# Patient Record
Sex: FEMALE | Race: WHITE | NOT HISPANIC OR LATINO | URBAN - METROPOLITAN AREA
[De-identification: names, ages, dates, MRNs, and addresses within clinical notes are randomized per-mention and may not be internally consistent; named-entity substitution may affect disease eponyms.]

---

## 2017-05-20 ENCOUNTER — EMERGENCY (EMERGENCY)
Facility: HOSPITAL | Age: 52
LOS: 1 days | Discharge: HOME | End: 2017-05-20
Attending: EMERGENCY MEDICINE | Admitting: EMERGENCY MEDICINE
Payer: COMMERCIAL

## 2017-05-20 VITALS
HEART RATE: 57 BPM | TEMPERATURE: 98 F | SYSTOLIC BLOOD PRESSURE: 136 MMHG | DIASTOLIC BLOOD PRESSURE: 84 MMHG | OXYGEN SATURATION: 96 % | RESPIRATION RATE: 15 BRPM

## 2017-05-20 VITALS
HEART RATE: 62 BPM | OXYGEN SATURATION: 100 % | RESPIRATION RATE: 14 BRPM | SYSTOLIC BLOOD PRESSURE: 128 MMHG | HEIGHT: 65 IN | TEMPERATURE: 98 F | WEIGHT: 119.93 LBS | DIASTOLIC BLOOD PRESSURE: 86 MMHG

## 2017-05-20 DIAGNOSIS — Z98.891 HISTORY OF UTERINE SCAR FROM PREVIOUS SURGERY: Chronic | ICD-10-CM

## 2017-05-20 PROCEDURE — 70450 CT HEAD/BRAIN W/O DYE: CPT

## 2017-05-20 PROCEDURE — 99284 EMERGENCY DEPT VISIT MOD MDM: CPT | Mod: 25

## 2017-05-20 PROCEDURE — 70450 CT HEAD/BRAIN W/O DYE: CPT | Mod: 26

## 2017-05-20 PROCEDURE — 70486 CT MAXILLOFACIAL W/O DYE: CPT

## 2017-05-20 PROCEDURE — 99284 EMERGENCY DEPT VISIT MOD MDM: CPT

## 2017-05-20 PROCEDURE — 70486 CT MAXILLOFACIAL W/O DYE: CPT | Mod: 26

## 2017-05-20 RX ORDER — IBUPROFEN 200 MG
2 TABLET ORAL
Qty: 0 | Refills: 0 | COMMUNITY

## 2017-05-20 RX ORDER — TRAMADOL HYDROCHLORIDE 50 MG/1
1 TABLET ORAL
Qty: 20 | Refills: 0 | OUTPATIENT
Start: 2017-05-20 | End: 2017-05-25

## 2017-05-20 NOTE — ED PROVIDER NOTE - OBJECTIVE STATEMENT
Patient came in complaining of frontal headache and pain top of head state been going on the past 4 days, state seen PMD 4 days ago started on Amoxicillin for poss sinusitis state symptom has not improve antibiotic change to cephalosporin this morning headache became so bad call PMD was told to came in to the hospital to have CT scan done. State prior to arrival in ER she took NSAID which seem to help the pain upon arrival to er.

## 2017-05-20 NOTE — ED ADULT NURSE NOTE - OBJECTIVE STATEMENT
Headache persistent for 1 1/2 weeks frontal and top of head, initially had congestion as well. Took 3 days of Amoxicillin without improvement and was switched to Cefadroxil 4 days ago, congestion relieved, headache persists with some Nausea. Headache persistent for 1 1/2 weeks frontal and top of head, initially had congestion as well. Took 3 days of Amoxicillin without improvement and was switched to Cefadroxil 4 days ago, congestion mostly relieved, headache persists with some nausea.

## 2017-05-20 NOTE — ED PROVIDER NOTE - CARE PLAN
Principal Discharge DX:	Acute non-recurrent maxillary sinusitis  Instructions for follow-up, activity and diet:	continue antibiotic prescribe by PMD

## 2017-05-20 NOTE — ED ADULT NURSE NOTE - CHPI ED SYMPTOMS NEG
no decreased eating/drinking/no chills/no vomiting/no numbness/no weakness/no fever/no dizziness/no tingling

## 2017-05-24 DIAGNOSIS — J01.00 ACUTE MAXILLARY SINUSITIS, UNSPECIFIED: ICD-10-CM

## 2017-05-24 DIAGNOSIS — R51 HEADACHE: ICD-10-CM

## 2017-05-29 ENCOUNTER — EMERGENCY (EMERGENCY)
Facility: HOSPITAL | Age: 52
LOS: 0 days | Discharge: HOME | End: 2017-05-29
Attending: EMERGENCY MEDICINE | Admitting: EMERGENCY MEDICINE
Payer: COMMERCIAL

## 2017-05-29 VITALS — HEIGHT: 64 IN | WEIGHT: 115.08 LBS

## 2017-05-29 VITALS
DIASTOLIC BLOOD PRESSURE: 82 MMHG | TEMPERATURE: 98 F | OXYGEN SATURATION: 100 % | SYSTOLIC BLOOD PRESSURE: 132 MMHG | HEART RATE: 60 BPM | RESPIRATION RATE: 19 BRPM

## 2017-05-29 DIAGNOSIS — Z98.891 HISTORY OF UTERINE SCAR FROM PREVIOUS SURGERY: Chronic | ICD-10-CM

## 2017-05-29 DIAGNOSIS — R51 HEADACHE: ICD-10-CM

## 2017-05-29 LAB
ANION GAP SERPL CALC-SCNC: 6 MMOL/L — SIGNIFICANT CHANGE UP (ref 5–17)
APTT BLD: 26.2 SEC — LOW (ref 27.5–37.4)
BASOPHILS # BLD AUTO: 0.1 K/UL — SIGNIFICANT CHANGE UP (ref 0–0.2)
BASOPHILS NFR BLD AUTO: 1.3 % — SIGNIFICANT CHANGE UP (ref 0–2)
BUN SERPL-MCNC: 24 MG/DL — HIGH (ref 7–23)
CALCIUM SERPL-MCNC: 8.9 MG/DL — SIGNIFICANT CHANGE UP (ref 8.5–10.1)
CHLORIDE SERPL-SCNC: 105 MMOL/L — SIGNIFICANT CHANGE UP (ref 96–108)
CO2 SERPL-SCNC: 31 MMOL/L — SIGNIFICANT CHANGE UP (ref 22–31)
CREAT SERPL-MCNC: 0.77 MG/DL — SIGNIFICANT CHANGE UP (ref 0.5–1.3)
EOSINOPHIL # BLD AUTO: 0.1 K/UL — SIGNIFICANT CHANGE UP (ref 0–0.5)
EOSINOPHIL NFR BLD AUTO: 1.6 % — SIGNIFICANT CHANGE UP (ref 0–6)
GLUCOSE SERPL-MCNC: 104 MG/DL — HIGH (ref 70–99)
HCT VFR BLD CALC: 42.3 % — SIGNIFICANT CHANGE UP (ref 34.5–45)
HGB BLD-MCNC: 14.6 G/DL — SIGNIFICANT CHANGE UP (ref 11.5–15.5)
INR BLD: 1.06 RATIO — SIGNIFICANT CHANGE UP (ref 0.88–1.16)
LYMPHOCYTES # BLD AUTO: 1 K/UL — SIGNIFICANT CHANGE UP (ref 1–3.3)
LYMPHOCYTES # BLD AUTO: 20.4 % — SIGNIFICANT CHANGE UP (ref 13–44)
MCHC RBC-ENTMCNC: 31.8 PG — SIGNIFICANT CHANGE UP (ref 27–34)
MCHC RBC-ENTMCNC: 34.4 GM/DL — SIGNIFICANT CHANGE UP (ref 32–36)
MCV RBC AUTO: 92.5 FL — SIGNIFICANT CHANGE UP (ref 80–100)
MONOCYTES # BLD AUTO: 0.4 K/UL — SIGNIFICANT CHANGE UP (ref 0–0.9)
MONOCYTES NFR BLD AUTO: 8.1 % — SIGNIFICANT CHANGE UP (ref 2–14)
NEUTROPHILS # BLD AUTO: 3.4 K/UL — SIGNIFICANT CHANGE UP (ref 1.8–7.4)
NEUTROPHILS NFR BLD AUTO: 68.6 % — SIGNIFICANT CHANGE UP (ref 43–77)
PLATELET # BLD AUTO: 154 K/UL — SIGNIFICANT CHANGE UP (ref 150–400)
POTASSIUM SERPL-MCNC: 4.3 MMOL/L — SIGNIFICANT CHANGE UP (ref 3.5–5.3)
POTASSIUM SERPL-SCNC: 4.3 MMOL/L — SIGNIFICANT CHANGE UP (ref 3.5–5.3)
PROTHROM AB SERPL-ACNC: 11.5 SEC — SIGNIFICANT CHANGE UP (ref 9.8–12.7)
RBC # BLD: 4.57 M/UL — SIGNIFICANT CHANGE UP (ref 3.8–5.2)
RBC # FLD: 11 % — SIGNIFICANT CHANGE UP (ref 10.3–14.5)
SODIUM SERPL-SCNC: 142 MMOL/L — SIGNIFICANT CHANGE UP (ref 135–145)
WBC # BLD: 4.9 K/UL — SIGNIFICANT CHANGE UP (ref 3.8–10.5)
WBC # FLD AUTO: 4.9 K/UL — SIGNIFICANT CHANGE UP (ref 3.8–10.5)

## 2017-05-29 PROCEDURE — 70450 CT HEAD/BRAIN W/O DYE: CPT | Mod: 26

## 2017-05-29 PROCEDURE — 99285 EMERGENCY DEPT VISIT HI MDM: CPT

## 2017-05-29 RX ORDER — MORPHINE SULFATE 50 MG/1
4 CAPSULE, EXTENDED RELEASE ORAL ONCE
Qty: 0 | Refills: 0 | Status: DISCONTINUED | OUTPATIENT
Start: 2017-05-29 | End: 2017-05-29

## 2017-05-29 RX ORDER — PROCHLORPERAZINE MALEATE 5 MG
10 TABLET ORAL ONCE
Qty: 0 | Refills: 0 | Status: DISCONTINUED | OUTPATIENT
Start: 2017-05-29 | End: 2017-05-29

## 2017-05-29 RX ORDER — DIPHENHYDRAMINE HCL 50 MG
25 CAPSULE ORAL ONCE
Qty: 0 | Refills: 0 | Status: COMPLETED | OUTPATIENT
Start: 2017-05-29 | End: 2017-05-29

## 2017-05-29 RX ORDER — PROCHLORPERAZINE MALEATE 5 MG
10 TABLET ORAL ONCE
Qty: 0 | Refills: 0 | Status: COMPLETED | OUTPATIENT
Start: 2017-05-29 | End: 2017-05-29

## 2017-05-29 RX ORDER — SODIUM CHLORIDE 9 MG/ML
1000 INJECTION INTRAMUSCULAR; INTRAVENOUS; SUBCUTANEOUS ONCE
Qty: 0 | Refills: 0 | Status: COMPLETED | OUTPATIENT
Start: 2017-05-29 | End: 2017-05-29

## 2017-05-29 RX ORDER — ONDANSETRON 8 MG/1
4 TABLET, FILM COATED ORAL ONCE
Qty: 0 | Refills: 0 | Status: DISCONTINUED | OUTPATIENT
Start: 2017-05-29 | End: 2017-05-29

## 2017-05-29 RX ADMIN — SODIUM CHLORIDE 1000 MILLILITER(S): 9 INJECTION INTRAMUSCULAR; INTRAVENOUS; SUBCUTANEOUS at 11:55

## 2017-05-29 RX ADMIN — Medication 25 MILLIGRAM(S): at 11:55

## 2017-05-29 RX ADMIN — Medication 10 MILLIGRAM(S): at 11:55

## 2017-05-29 RX ADMIN — MORPHINE SULFATE 4 MILLIGRAM(S): 50 CAPSULE, EXTENDED RELEASE ORAL at 13:55

## 2017-05-29 NOTE — ED STATDOCS - PROGRESS NOTE DETAILS
KODI Lockwood Gadit:  Pt p/w worsening HA x 1 wk. Denies any fever, nefck stiffness, N/V. Pt was tx for sinusitis. PE: NAD, S1, S2, CTA bilat. Abd is soft, NT/ND, +BS. No focal deficit. CN grossly intact. Plan: check CT head, labs, meds, IVF and reasses. KODI Mohr:  s/w radiologist regarding the CT scan. states the old hemorrhage is months maybe more than a year old. recommended outpt MRI.  Called neurologist on call. KODI Mohr:  s/w radiologist regarding the CT scan. states the old hemorrhage is months maybe more than a year old. recommended outpt MRI.  Called neurologist on call Dr. Pierre, pt needs ot have outpt w/u for hygroma. KODI Jesusit:  Pt feeling better, copy of CT scan report and CD given. Has an appointment tomorrow with neurologist tomorrow. advised to f/u with them.

## 2017-05-29 NOTE — ED STATDOCS - NS ED MD SCRIBE ATTENDING SCRIBE SECTIONS
PAST MEDICAL/SURGICAL/SOCIAL HISTORY/RESULTS/REVIEW OF SYSTEMS/PROGRESS NOTE/HISTORY OF PRESENT ILLNESS/PHYSICAL EXAM/DISPOSITION

## 2017-05-29 NOTE — ED STATDOCS - OBJECTIVE STATEMENT
50 y/o female pt presents to ED complaining of worsening sinusitis and migraines for a week. Pt takes Amitrax for pain but states that it does nothing for relief. Pt was at [facility] last week where she underwent a CT scan. Pt has no changes to vision. ENT Dr. Caleb Valentino told pt to come to [facility]. PT has no other complaints and denies SOB, CP and fever/chills. 50 y/o female pt presents to ED complaining of worsening sinusitis and migraines for a week. Pt takes Amitrax for pain but states that it does nothing for relief. Pt was at [facility] last week where she underwent a CT scan. Pt has no changes to vision. ENT Dr. Caleb Valentino told pt to come to [facility]. PT has no other complaints and denies SOB, CP and fever/chills.  No neck pain/stiffness.

## 2017-05-29 NOTE — ED ADULT NURSE NOTE - CHPI ED SYMPTOMS NEG
no loss of consciousness/no fever/no syncope/no nausea/no change in level of consciousness/no chills/no numbness/no vomiting/no blurred vision

## 2017-05-29 NOTE — ED STATDOCS - MEDICAL DECISION MAKING DETAILS
52 y/o female pt presents to ED complaining of sinus and migraines. Plan CT scan and MRI. Will re-assess.

## 2017-05-29 NOTE — ED STATDOCS - CONSTITUTIONAL, MLM
normal... well appearing, well nourished, and in no apparent distress. well appearing, well nourished, and in no apparent distress. She is not toxic looking.

## 2017-05-29 NOTE — ED STATDOCS - DETAILS:
I, Katherine Gonzalez, performed the initial face to face bedside interview with this patient regarding history of present illness, review of symptoms and relevant past medical, social and family history.  I completed an independent physical examination.  I was the initial provider who evaluated this patient. I have signed out the follow up of any pending tests (i.e. labs, radiological studies) to the ACP.  I have communicated the patient’s plan of care and disposition with the ACP.  The history, relevant review of systems, past medical and surgical history, medical decision making, and physical examination was documented by the scribe in my presence and I attest to the accuracy of the documentation.  I personally saw the patient with the PA, and completed the key components of the history and physical exam. I then discussed the management plan with the PA.

## 2017-11-09 NOTE — ED ADULT NURSE NOTE - CAS EDN DISCHARGE INTERVENTIONS
PHYSICAL THERAPY - DAILY TREATMENT NOTE    Patient Name: Pérez Bansal        Date: 2017  : 1943   YES Patient  Verified  Visit #:   3   of   8  Insurance: Payor: [place] [employer] / Plan: [place] MEDICARE PART A & B / Product Type: Medicare /      In time: 3:10 Out time: 3:37   Total Treatment Time: 27     Medicare Time Tracking (below)   Total Timed Codes (min):  27 1:1 Treatment Time:  27     TREATMENT AREA =  Dizziness [R42]    SUBJECTIVE  Pain Level (on 0 to 10 scale):  4  / 10   Medication Changes/New allergies or changes in medical history, any new surgeries or procedures? NO    If yes, update Summary List   Subjective Functional Status/Changes:  []  No changes reported     Patient reports that she has increased L hip pain after doing strengthening exercises. Feels it is the one where she straightens her legs and  The one where she lifts her thighs up. She has penny consistent with HEP daily. OBJECTIVE    27 min Therapeutic Exercise:  [x]  See flow sheet   Rationale:      increase ROM, improve coordination, improve balance and increase proprioception to improve the patients ability to decrease fall risk        min Patient Education:  YES  Reviewed HEP   [x]  Progressed/Changed HEP based on: Add looking side to side and up and down slowly. Other Objective/Functional Measures:    Decreased hip flex, LAQs to 5 reps. Added stand against wall. Post Treatment Pain Level (on 0 to 10) scale:   4  / 10     ASSESSMENT  Assessment/Changes in Function:     Mild nausea with 3 reps of head movements. Patient able to stand unsupported 30 seconds while preparing to leave clinic. Good compliance with strengthening exercises. []  See Progress Note/Recertification   Patient will continue to benefit from skilled PT services to modify and progress therapeutic interventions, address imbalance/dizziness and instruct in home and community integration to attain remaining goals.    Progress toward goals / Updated goals:  1) Patient performing daily HEP and educated in fall prevention techniques. progressing  2) Patient will be able to maintain stand unsupported for 30 seconds eyes open to increase safety with ADLs. 3) Patient able to perform 5 repititions of cervical rotation, flexion and extension with no > mild symptoms. progressing  4) Patient to score 45 on DHI indicating improved function.   5) Increase bilateral hip flexion/abduction to 3+/5 to facilitate standing       PLAN  [x]  Upgrade activities as tolerated YES Continue plan of care   []  Discharge due to :    []  Other:      Therapist: Edie Morris, PT    Date: 11/9/2017 Time: 3:11 PM     Future Appointments  Date Time Provider Arielle Mcintoshi   11/13/2017 9:30 AM Omar Mccormick MD [id number] [place] [address]   11/16/2017 3:00 PM Noman Cowart PTA [facility]   11/21/2017 3:00 PM Edie Morris, PT [facility]   11/28/2017 3:00 PM Edie Morris, PT [facility]   12/5/2017 3:00 PM Noman Cowart PTA [facility]   12/12/2017 3:00 PM Edie Morris, PT [facility] IV discontinued, cath removed intact

## 2022-11-07 ENCOUNTER — LABORATORY RESULT (OUTPATIENT)
Age: 57
End: 2022-11-07

## 2022-11-07 ENCOUNTER — APPOINTMENT (OUTPATIENT)
Dept: PULMONOLOGY | Facility: CLINIC | Age: 57
End: 2022-11-07

## 2022-11-07 VITALS
WEIGHT: 108 LBS | TEMPERATURE: 97.7 F | OXYGEN SATURATION: 93 % | DIASTOLIC BLOOD PRESSURE: 62 MMHG | BODY MASS INDEX: 18.44 KG/M2 | HEIGHT: 64 IN | RESPIRATION RATE: 16 BRPM | SYSTOLIC BLOOD PRESSURE: 104 MMHG | HEART RATE: 54 BPM

## 2022-11-07 DIAGNOSIS — Z81.8 FAMILY HISTORY OF OTHER MENTAL AND BEHAVIORAL DISORDERS: ICD-10-CM

## 2022-11-07 DIAGNOSIS — Z86.19 PERSONAL HISTORY OF OTHER INFECTIOUS AND PARASITIC DISEASES: ICD-10-CM

## 2022-11-07 DIAGNOSIS — Z86.2 PERSONAL HISTORY OF DISEASES OF THE BLOOD AND BLOOD-FORMING ORGANS AND CERTAIN DISORDERS INVOLVING THE IMMUNE MECHANISM: ICD-10-CM

## 2022-11-07 DIAGNOSIS — Z82.49 FAMILY HISTORY OF ISCHEMIC HEART DISEASE AND OTHER DISEASES OF THE CIRCULATORY SYSTEM: ICD-10-CM

## 2022-11-07 DIAGNOSIS — Z78.9 OTHER SPECIFIED HEALTH STATUS: ICD-10-CM

## 2022-11-07 DIAGNOSIS — Z87.891 PERSONAL HISTORY OF NICOTINE DEPENDENCE: ICD-10-CM

## 2022-11-07 PROCEDURE — 94729 DIFFUSING CAPACITY: CPT

## 2022-11-07 PROCEDURE — 95012 NITRIC OXIDE EXP GAS DETER: CPT

## 2022-11-07 PROCEDURE — 71046 X-RAY EXAM CHEST 2 VIEWS: CPT

## 2022-11-07 PROCEDURE — 99204 OFFICE O/P NEW MOD 45 MIN: CPT | Mod: 25

## 2022-11-07 PROCEDURE — 94010 BREATHING CAPACITY TEST: CPT

## 2022-11-07 PROCEDURE — 94727 GAS DIL/WSHOT DETER LNG VOL: CPT

## 2022-11-07 PROCEDURE — 94618 PULMONARY STRESS TESTING: CPT

## 2022-11-07 RX ORDER — ACETAMINOPHEN EXTRA STRENGTH 500 MG/1
500 TABLET ORAL
Refills: 0 | Status: ACTIVE | COMMUNITY

## 2022-11-07 RX ORDER — ALPRAZOLAM 0.25 MG/1
0.25 TABLET ORAL
Qty: 5 | Refills: 0 | Status: ACTIVE | COMMUNITY
Start: 2022-08-31

## 2022-11-07 NOTE — PHYSICAL EXAM
[No Acute Distress] : no acute distress [Normal Oropharynx] : normal oropharynx [Normal Appearance] : normal appearance [No Neck Mass] : no neck mass [Normal Rate/Rhythm] : normal rate/rhythm [Normal S1, S2] : normal s1, s2 [No Murmurs] : no murmurs [No Resp Distress] : no resp distress [Clear to Auscultation Bilaterally] : clear to auscultation bilaterally [No Abnormalities] : no abnormalities [Benign] : benign [Normal Gait] : normal gait [No Clubbing] : no clubbing [No Cyanosis] : no cyanosis [No Edema] : no edema [FROM] : FROM [Normal Color/ Pigmentation] : normal color/ pigmentation [Oriented x3] : oriented x3 [No Focal Deficits] : no focal deficits [Normal Affect] : normal affect [III] : Mallampati Class: III [TextBox_2] : thin [TextBox_68] : I:E 1:3, clear

## 2022-11-07 NOTE — ASSESSMENT
[FreeTextEntry1] : Ms. MILLER is a 57 year year old female coming into the office today for an initial evaluation with a prior history of spinal leak s/p blood patch 2018, remote chickenpox, thrombocytopenia (allergic age 42 midst of pregnancy, s/p COVID-19 x2 1/2021 and summer 2022, carries the diagnosis of mild persistent asthma, chronic cough, s/p recent PNA Aug 2022 who now comes in for a pulmonary evaluation for chronic cough, persistent hemoptysis\par \par The patient's Chronic Cough is felt to be multifactorial:\par - Asthma\par - Allergy and Sinus\par - ?Reflux (less likely)\par - Bronchiectasis\par \par Problem 1: Chronic cough \par - Any cough greater than three weeks duration-differential diagnosis includes-asthma, upper airway cough syndrome, post nasal drip syndrome, gastroesophageal reflux, laryngopharyngeal reflux, cardiac disease (congestive heart failure, medicines, effects, etc), medication effects (b-blockers, ace inhibitors, ARBs, glaucoma meds, etc.), smoking, infectious, multifactorial, etc. \par \par Problem 2: Asthma \par - add Trelegy 200 1 inhalation QD \par - Inhaler technique reviewed as well as oral hygiene techniques reviewed with patient. Avoidance of cold air, extremes of temperature, rescue inhaler should be used before exercise. Order of medication reviewed with patient. Recommended use of a cool mist humidifier in the bedroom. \par - Asthma is believed to be caused by inherited (genetic) and environmental factor, but its exact cause is unknown. Asthma may be triggered by allergens, lung infections, or irritants in the air. Asthma triggers are different for each person \par \par Problem 3: Allergy and PND\par - Add Olopatadine 0.6% 1 sniff each nostril twice a day \par - Script given for blood work: asthma profile, food IgE panel, eosinophil level, IgE level, Vitamin D level \par - Environmental measures for allergies were encouraged including mattress and pillow cover, air purifier, and environmental controls. \par \par Problem 4: ?GERD\par -Rule of 2s: avoid eating too much, eating too late, eating too spicy, eating two hours before bed.\par - Things to avoid including overeating, spicy foods, tight clothing, eating within two hours of bed, this list is not all inclusive.\par - For treatments of reflux, possible options discussed including diet control, H2 blockers, PPIs, as well as coating motility agents discussed as treatment options. Timing of meals and proximity of last meal to sleep were discussed. If symptoms persist, a formal gastrointestinal evaluation is needed. \par \par Problem 5: Bronchiectasis\par - Complete strep pneumonia titers, quantitative immunoglobulins, IgG subclass. \par - Get CF Panel \par - Aerobika Device \par - Send Sputum for Afbx3 and C&S \par - Seen on the CT of the chest or chest x-ray signifies damaged bronchial tubes focal or diffuse which can be sites of recurrent infections. These areas can be colonized by various organisms including bacteria (hemophilous influenzae/Pseudomonas species etc.) as well as acid fast bacilli (mycobacterial disease- inclusive of TB/TAJ etc.). Sputum either for bacteria culture/sensitivity or AFB culture and sensitivity will need to be sent if the patient has sputum- 3 specimens on consecutive days will need to be dropped at the laboratory- if the patient can produce sputum.\par \par Problem 6: Hemoptysis (likely related to Bronchiectasis, ?TAJ/JU)\par - Get HRCT \par - Get Quantiferon gold test \par - Hydration \par - Quantification of the blood and if it persist then consider bronchoscopy depending on amount of blood \par - The patient has coughed up blood-possibilities include acute/bronchitis, bronchiectasis, endobronchial tumor, epistaxis or other hand and neck source or gastrointestinal site. \par \par Problem 7: COVID-19 Education \par - s/p COVID-19 x2 1/2021 and summer 2022\par \par Problem 8: Cardiac\par -Recommended cardiac follow up evaluation with cardiologist if needed \par \par Problem 9: Health maintenance\par -s/p flu shot\par -recommended strep pneumonia vaccines: Prevnar-20 vaccine, followed by Pneumo vaccine 23 one year following\par -recommended early intervention for URIs\par -recommended regular osteoporosis evaluations-recommended early dermatological evaluations\par -recommended after the age of 50 to the age of 70, colonoscopy every 5 years\par \par \par Follow up in 3-4 months\par pt is encouraged to call or fax the office with any questions or concerns.\par -Education provided to the PT regarding their visit and conditions.

## 2022-11-07 NOTE — PROCEDURE
[FreeTextEntry1] : FENO was 18; a normal value being less than 25\par Fractional exhaled nitric oxide (FENO) is regarded as a simple, noninvasive method for assessing eosinophilic airway inflammation. Produced by a variety of cells within the lung, nitric oxide (NO) concentrations are generally low in healthy individuals. However, high concentrations of NO appear to be involved in nonspecific host defense mechanisms and chronic inflammatory diseases such as asthma. The American Thoracic Society (ATS) therefore has recommended using FENO to aid in the diagnosis and monitoring of eosinophilic airway inflammation and asthma, and for identifying steroid responsive individuals whose chronic respiratory symptoms may be caused by airway inflammation. \par \par CT (8/15/2022) revealed patchy consolidation in the middle lobe with impacted and dilated bronchi. Infiltrates in the right lower lobe. Lingular peribronchial thickening and bronchiectasis. \par \par Full PFT- spi reveals normal flows; FEV1 was 2.75L which is 109% of predicted; normal lung volumes; normal diffusion at 19.3, which is 111% of predicted; normal flow volume loop \par \par CXR revealed a normal sized heart; RML bronchiectasis. No other infiltrate\par \par 6 minute walk test reveals a low saturation of 92% with no evidence of dyspnea or fatigue; walked 440 meters

## 2022-11-07 NOTE — HISTORY OF PRESENT ILLNESS
[TextBox_4] : Ms. MILLER is a 57 year old female presenting to the office today for initial pulmonary evaluation. Her chief complaint is-\par - she notes when she was pregnant she had low platelet count \par - she notes this past summer she started coughing \par - she notes then she coughed up about tablespoon of blood \par - she notes getting X-ray and CAT scan done \par - she notes it revealed PNA and Bronchiectasis and was given antibiotics\par - she notes she started coughing again last month and then again bring up little blood \par - she notes getting another X-Ray and was told there is no more PNA \par - she notes wet cough\par - she denies any visual issues \par - she notes feeling of lump in throat to clear \par - she notes sleeping well, about 6.5-7 hours \par - she denies snoring  \par - she notes nasal drip sometimes\par - she notes doing cardio, weights \par - she notes memory and concentration is good\par - she notes weight is stable \par \par she denies any headaches, nausea, vomiting, fever, chills, sweats, chest pain, chest pressure, diarrhea, constipation, dysphagia, dizziness, leg swelling, leg pain, itchy eyes, itchy ears, heartburn, reflux, sour taste in the mouth, myalgias or arthralgias.

## 2022-11-07 NOTE — ADDENDUM
[FreeTextEntry1] : Documented by Avi Quiles acting as a scribe for Dr. Chino Brandon on (11/07/2022).\par \par All medical record entries made by the Scribe were at my, Dr. Chino Brandon's, direction and personally dictated by me on (11/07/2022). I have reviewed the chart and agree that the record accurately reflects my personal performance of the history, physical exam, assessment and plan. I have personally directed, reviewed, and agree with the discharge instructions.

## 2022-11-08 LAB
24R-OH-CALCIDIOL SERPL-MCNC: 53.5 PG/ML
BASOPHILS # BLD AUTO: 0.02 K/UL
BASOPHILS NFR BLD AUTO: 0.3 %
DEPRECATED KAPPA LC FREE/LAMBDA SER: 1.11 RATIO
EOSINOPHIL # BLD AUTO: 0.05 K/UL
EOSINOPHIL NFR BLD AUTO: 0.8 %
HCT VFR BLD CALC: 40.7 %
HGB BLD-MCNC: 13.2 G/DL
IGA SER QL IEP: 143 MG/DL
IGG SER QL IEP: 828 MG/DL
IGM SER QL IEP: 64 MG/DL
IMM GRANULOCYTES NFR BLD AUTO: 0.2 %
KAPPA LC CSF-MCNC: 0.97 MG/DL
KAPPA LC SERPL-MCNC: 1.08 MG/DL
LYMPHOCYTES # BLD AUTO: 1.5 K/UL
LYMPHOCYTES NFR BLD AUTO: 24 %
MAN DIFF?: NORMAL
MCHC RBC-ENTMCNC: 31.1 PG
MCHC RBC-ENTMCNC: 32.4 GM/DL
MCV RBC AUTO: 96 FL
MONOCYTES # BLD AUTO: 0.47 K/UL
MONOCYTES NFR BLD AUTO: 7.5 %
NEUTROPHILS # BLD AUTO: 4.19 K/UL
NEUTROPHILS NFR BLD AUTO: 67.2 %
PLATELET # BLD AUTO: 188 K/UL
RBC # BLD: 4.24 M/UL
RBC # FLD: 13.2 %
WBC # FLD AUTO: 6.24 K/UL

## 2022-11-09 LAB
A ALTERNATA IGE QN: <0.1 KUA/L
A ALTERNATA IGE QN: <0.1 KUA/L
A FUMIGATUS IGE QN: <0.1 KUA/L
A FUMIGATUS IGE QN: <0.1 KUA/L
BERMUDA GRASS IGE QN: <0.1 KUA/L
BOXELDER IGE QN: <0.1 KUA/L
C ALBICANS IGE QN: <0.1 KUA/L
C HERBARUM IGE QN: <0.1 KUA/L
C HERBARUM IGE QN: <0.1 KUA/L
CALIF WALNUT IGE QN: <0.1 KUA/L
CAT DANDER IGE QN: <0.1 KUA/L
CAT DANDER IGE QN: <0.1 KUA/L
CLAM IGE QN: <0.1 KUA/L
CMN PIGWEED IGE QN: <0.1 KUA/L
CODFISH IGE QN: <0.1 KUA/L
COMMON RAGWEED IGE QN: <0.1 KUA/L
COMMON RAGWEED IGE QN: <0.1 KUA/L
CORN IGE QN: <0.1 KUA/L
COTTONWOOD IGE QN: <0.1 KUA/L
COW MILK IGE QN: 0.16 KUA/L
D FARINAE IGE QN: <0.1 KUA/L
D FARINAE IGE QN: <0.1 KUA/L
D PTERONYSS IGE QN: <0.1 KUA/L
D PTERONYSS IGE QN: <0.1 KUA/L
DEPRECATED A ALTERNATA IGE RAST QL: 0
DEPRECATED A ALTERNATA IGE RAST QL: 0
DEPRECATED A FUMIGATUS IGE RAST QL: 0
DEPRECATED A FUMIGATUS IGE RAST QL: 0
DEPRECATED BERMUDA GRASS IGE RAST QL: 0
DEPRECATED BOXELDER IGE RAST QL: 0
DEPRECATED C ALBICANS IGE RAST QL: 0
DEPRECATED C HERBARUM IGE RAST QL: 0
DEPRECATED C HERBARUM IGE RAST QL: 0
DEPRECATED CAT DANDER IGE RAST QL: 0
DEPRECATED CAT DANDER IGE RAST QL: 0
DEPRECATED CLAM IGE RAST QL: 0
DEPRECATED CODFISH IGE RAST QL: 0
DEPRECATED COMMON PIGWEED IGE RAST QL: 0
DEPRECATED COMMON RAGWEED IGE RAST QL: 0
DEPRECATED COMMON RAGWEED IGE RAST QL: 0
DEPRECATED CORN IGE RAST QL: 0
DEPRECATED COTTONWOOD IGE RAST QL: 0
DEPRECATED COW MILK IGE RAST QL: NORMAL
DEPRECATED D FARINAE IGE RAST QL: 0
DEPRECATED D FARINAE IGE RAST QL: 0
DEPRECATED D PTERONYSS IGE RAST QL: 0
DEPRECATED D PTERONYSS IGE RAST QL: 0
DEPRECATED DOG DANDER IGE RAST QL: 0
DEPRECATED DOG DANDER IGE RAST QL: 0
DEPRECATED DUCK FEATHER IGE RAST QL: 0
DEPRECATED EGG WHITE IGE RAST QL: NORMAL
DEPRECATED GOOSE FEATHER IGE RAST QL: 0
DEPRECATED GOOSEFOOT IGE RAST QL: 0
DEPRECATED LONDON PLANE IGE RAST QL: 0
DEPRECATED M RACEMOSUS IGE RAST QL: 0
DEPRECATED MOUSE URINE PROT IGE RAST QL: 0
DEPRECATED MUGWORT IGE RAST QL: 0
DEPRECATED P NOTATUM IGE RAST QL: 0
DEPRECATED PEANUT IGE RAST QL: 0
DEPRECATED RED CEDAR IGE RAST QL: 0
DEPRECATED ROACH IGE RAST QL: 0
DEPRECATED ROACH IGE RAST QL: 0
DEPRECATED SCALLOP IGE RAST QL: <0.1 KUA/L
DEPRECATED SESAME SEED IGE RAST QL: 0
DEPRECATED SHEEP SORREL IGE RAST QL: 0
DEPRECATED SHRIMP IGE RAST QL: 0
DEPRECATED SILVER BIRCH IGE RAST QL: 0
DEPRECATED SOYBEAN IGE RAST QL: 0
DEPRECATED TIMOTHY IGE RAST QL: 0
DEPRECATED TIMOTHY IGE RAST QL: 0
DEPRECATED WALNUT IGE RAST QL: 0
DEPRECATED WHEAT IGE RAST QL: 0
DEPRECATED WHITE ASH IGE RAST QL: 0
DEPRECATED WHITE OAK IGE RAST QL: 0
DEPRECATED WHITE OAK IGE RAST QL: 0
DOG DANDER IGE QN: <0.1 KUA/L
DOG DANDER IGE QN: <0.1 KUA/L
DUCK FEATHER IGE QN: <0.1 KUA/L
EGG WHITE IGE QN: 0.31 KUA/L
GOOSE FEATHER IGE QN: <0.1 KUA/L
GOOSEFOOT IGE QN: <0.1 KUA/L
LONDON PLANE IGE QN: <0.1 KUA/L
M RACEMOSUS IGE QN: <0.1 KUA/L
M TB IFN-G BLD-IMP: NEGATIVE
MOUSE URINE PROT IGE QN: <0.1 KUA/L
MUGWORT IGE QN: <0.1 KUA/L
MULBERRY (T70) CLASS: 0
MULBERRY (T70) CONC: <0.1 KUA/L
P NOTATUM IGE QN: <0.1 KUA/L
PEANUT IGE QN: <0.1 KUA/L
QUANTIFERON TB PLUS MITOGEN MINUS NIL: 4.94 IU/ML
QUANTIFERON TB PLUS NIL: 0.02 IU/ML
QUANTIFERON TB PLUS TB1 MINUS NIL: 0 IU/ML
QUANTIFERON TB PLUS TB2 MINUS NIL: 0 IU/ML
RED CEDAR IGE QN: <0.1 KUA/L
ROACH IGE QN: <0.1 KUA/L
ROACH IGE QN: <0.1 KUA/L
SCALLOP IGE QN: 0
SCALLOP IGE QN: <0.1 KUA/L
SESAME SEED IGE QN: <0.1 KUA/L
SHEEP SORREL IGE QN: <0.1 KUA/L
SILVER BIRCH IGE QN: <0.1 KUA/L
SOYBEAN IGE QN: <0.1 KUA/L
TIMOTHY IGE QN: <0.1 KUA/L
TIMOTHY IGE QN: <0.1 KUA/L
TOTAL IGE SMQN RAST: 306 KU/L
TREE ALLERG MIX1 IGE QL: 0
WALNUT IGE QN: <0.1 KUA/L
WHEAT IGE QN: <0.1 KUA/L
WHITE ASH IGE QN: <0.1 KUA/L
WHITE ELM IGE QN: 0
WHITE ELM IGE QN: <0.1 KUA/L
WHITE OAK IGE QN: <0.1 KUA/L
WHITE OAK IGE QN: <0.1 KUA/L

## 2022-11-10 LAB
25(OH)D3 SERPL-MCNC: 18.6 NG/ML
DEPRECATED S PNEUM 1 IGG SER-MCNC: 2.8 MCG/ML
DEPRECATED S PNEUM12 AB SER-ACNC: <0.4 MCG/ML
DEPRECATED S PNEUM14 AB SER-ACNC: 1.9 MCG/ML
DEPRECATED S PNEUM17 IGG SER IA-MCNC: 1.2 MCG/ML
DEPRECATED S PNEUM18 IGG SER IA-MCNC: 1.4 MCG/ML
DEPRECATED S PNEUM19 IGG SER-MCNC: 11.4 MCG/ML
DEPRECATED S PNEUM19 IGG SER-MCNC: 2.9 MCG/ML
DEPRECATED S PNEUM2 IGG SER-MCNC: 2 MCG/ML
DEPRECATED S PNEUM20 IGG SER-MCNC: <0.4 MCG/ML
DEPRECATED S PNEUM22 IGG SER-MCNC: 7.3 MCG/ML
DEPRECATED S PNEUM23 AB SER-ACNC: 13 MCG/ML
DEPRECATED S PNEUM3 AB SER-ACNC: <0.4 MCG/ML
DEPRECATED S PNEUM34 IGG SER-MCNC: 0.9 MCG/ML
DEPRECATED S PNEUM4 AB SER-ACNC: 2.2 MCG/ML
DEPRECATED S PNEUM5 IGG SER-MCNC: 1.8 MCG/ML
DEPRECATED S PNEUM6 IGG SER-MCNC: 1 MCG/ML
DEPRECATED S PNEUM7 IGG SER-ACNC: 1.7 MCG/ML
DEPRECATED S PNEUM8 AB SER-ACNC: 0.5 MCG/ML
DEPRECATED S PNEUM9 AB SER-ACNC: NORMAL MCG/ML
DEPRECATED S PNEUM9 IGG SER-MCNC: 1.7 MCG/ML
IGG SUBSET TOTAL IGG: 833 MG/DL
IGG1 SER-MCNC: 433 MG/DL
IGG2 SER-MCNC: 257 MG/DL
IGG3 SER-MCNC: 44 MG/DL
IGG4 SER-MCNC: 14 MG/DL
STREPTOCOCCUS PNEUMONIAE SEROTYPE 11A: 0.8 MCG/ML
STREPTOCOCCUS PNEUMONIAE SEROTYPE 15B: 1.4 MCG/ML
STREPTOCOCCUS PNEUMONIAE SEROTYPE 33F: 0.5 MCG/ML

## 2022-11-12 LAB — CFTR MUT TESTED BLD/T: NEGATIVE

## 2022-11-30 ENCOUNTER — NON-APPOINTMENT (OUTPATIENT)
Age: 57
End: 2022-11-30

## 2023-01-10 ENCOUNTER — NON-APPOINTMENT (OUTPATIENT)
Age: 58
End: 2023-01-10

## 2023-06-09 ENCOUNTER — APPOINTMENT (OUTPATIENT)
Dept: PULMONOLOGY | Facility: CLINIC | Age: 58
End: 2023-06-09
Payer: COMMERCIAL

## 2023-06-09 ENCOUNTER — TRANSCRIPTION ENCOUNTER (OUTPATIENT)
Age: 58
End: 2023-06-09

## 2023-06-09 VITALS
TEMPERATURE: 98.2 F | WEIGHT: 107 LBS | BODY MASS INDEX: 18.27 KG/M2 | SYSTOLIC BLOOD PRESSURE: 110 MMHG | HEIGHT: 64 IN | RESPIRATION RATE: 16 BRPM | DIASTOLIC BLOOD PRESSURE: 80 MMHG | HEART RATE: 66 BPM | OXYGEN SATURATION: 93 %

## 2023-06-09 DIAGNOSIS — J47.9 BRONCHIECTASIS, UNCOMPLICATED: ICD-10-CM

## 2023-06-09 DIAGNOSIS — U07.1 COVID-19: ICD-10-CM

## 2023-06-09 DIAGNOSIS — J18.9 PNEUMONIA, UNSPECIFIED ORGANISM: ICD-10-CM

## 2023-06-09 PROCEDURE — 94010 BREATHING CAPACITY TEST: CPT

## 2023-06-09 PROCEDURE — 95012 NITRIC OXIDE EXP GAS DETER: CPT

## 2023-06-09 PROCEDURE — 71046 X-RAY EXAM CHEST 2 VIEWS: CPT

## 2023-06-09 PROCEDURE — 94727 GAS DIL/WSHOT DETER LNG VOL: CPT

## 2023-06-09 PROCEDURE — 99214 OFFICE O/P EST MOD 30 MIN: CPT | Mod: 25

## 2023-06-09 PROCEDURE — 94729 DIFFUSING CAPACITY: CPT

## 2023-06-09 RX ORDER — DOXYCYCLINE HYCLATE 100 MG/1
100 CAPSULE ORAL
Qty: 20 | Refills: 0 | Status: DISCONTINUED | COMMUNITY
Start: 2022-11-04 | End: 2023-06-09

## 2023-06-09 NOTE — REASON FOR VISIT
[Follow-Up] : a follow-up visit [TextBox_44] : chronic cough, persistent hemoptysis, asthma, allergies, ?bronchiectasis

## 2023-06-09 NOTE — ASSESSMENT
[FreeTextEntry1] : Ms. MILLER is a 57 year year old female coming into the office today for a f/p pulmonary evaluation with a prior history of spinal leak s/p blood patch 2018, remote chickenpox, thrombocytopenia (allergic age 42 midst of pregnancy, s/p COVID-19 x2 1/2021 and summer 2022, carries the diagnosis of mild persistent asthma, chronic cough, s/p recent PNA Aug 2022 who now comes in for a pulmonary evaluation for chronic cough, persistent hemoptysis; elevated IgE (306)\par \par The patient's Chronic Cough is felt to be multifactorial:\par - Asthma\par - Allergy and Sinus\par - ?Reflux (less likely)\par - Bronchiectasis\par \par Problem 1: Chronic cough \par - Any cough greater than three weeks duration-differential diagnosis includes-asthma, upper airway cough syndrome, post nasal drip syndrome, gastroesophageal reflux, laryngopharyngeal reflux, cardiac disease (congestive heart failure, medicines, effects, etc), medication effects (b-blockers, ace inhibitors, ARBs, glaucoma meds, etc.), smoking, infectious, multifactorial, etc. \par \par Problem 2: Asthma \par - continue Trelegy 200 1 inhalation QD\par - add Ventolin 2 puffs Q6H, pre-exercise \par - Inhaler technique reviewed as well as oral hygiene techniques reviewed with patient. Avoidance of cold air, extremes of temperature, rescue inhaler should be used before exercise. Order of medication reviewed with patient. Recommended use of a cool mist humidifier in the bedroom. \par - Asthma is believed to be caused by inherited (genetic) and environmental factor, but its exact cause is unknown. Asthma may be triggered by allergens, lung infections, or irritants in the air. Asthma triggers are different for each person \par \par Problem 2A: Elevated IgE\par -add Xolair if needed \par Xolair is a recombinant DNA- derived humanized IgG1K monoclonal antibody that selectively binds to human immunoglobulin E (IgE). Xolair is produced by a Chinese hamster ovary cell suspension culture in nutrient medium containing the antibiotic gentamicin. Gentamicin is not detectable in the final product.\par Xolair is a sterile, white, preservative free, lyophilized powder contained in a single use vial that is reconstituted with sterile water for suspension. Side effects include: wheezing, tightness of the chest, trouble breathing, hives, skin rash, feeling anxious or light-headed, fainting, warmth or tingling under skin, or swelling of face, lips, or tongue. \par \par Problem 3: Allergy and PND\par - Add Olopatadine 0.6% 1 sniff each nostril twice a day \par - s/p blood work: asthma profile, food IgE panel (+), eosinophil level, IgE level (306), Vitamin D level \par - Environmental measures for allergies were encouraged including mattress and pillow cover, air purifier, and environmental controls. \par \par Problem 4: ?GERD\par -Rule of 2s: avoid eating too much, eating too late, eating too spicy, eating two hours before bed.\par - Things to avoid including overeating, spicy foods, tight clothing, eating within two hours of bed, this list is not all inclusive.\par - For treatments of reflux, possible options discussed including diet control, H2 blockers, PPIs, as well as coating motility agents discussed as treatment options. Timing of meals and proximity of last meal to sleep were discussed. If symptoms persist, a formal gastrointestinal evaluation is needed. \par \par Problem 5: Bronchiectasis, (+) PNA\par -add Augmentin 875 mg BID for 8 days\par - s/p strep pneumonia titers (low), quantitative immunoglobulins, IgG subclass (normal)\par - s/p CF Panel (normal)\par - Aerobika Device \par - Send Sputum for Afbx3 and C&S (NC)-rescripted 6/2023\par - Seen on the CT of the chest or chest x-ray signifies damaged bronchial tubes focal or diffuse which can be sites of recurrent infections. These areas can be colonized by various organisms including bacteria (hemophilous influenzae/Pseudomonas species etc.) as well as acid fast bacilli (mycobacterial disease- inclusive of TB/TAJ etc.). Sputum either for bacteria culture/sensitivity or AFB culture and sensitivity will need to be sent if the patient has sputum- 3 specimens on consecutive days will need to be dropped at the laboratory- if the patient can produce sputum.\par \par \par \par Problem 6: Hemoptysis (likely related to Bronchiectasis, ?TAJ/JU) (active)\par - s/p HRCT \par - s/p Quantiferon gold test (WNL)\par - Hydration \par - Quantification of the blood and if it persist then consider bronchoscopy depending on amount of blood \par - The patient has coughed up blood-possibilities include acute/bronchitis, bronchiectasis, endobronchial tumor, epistaxis or other hand and neck source or gastrointestinal site. \par \par Problem 7: COVID-19 Education \par - s/p COVID-19 x2 1/2021 and summer 2022\par \par Problem 8: Cardiac\par -Recommended cardiac follow up evaluation with cardiologist if needed \par \par Problem 9: Health maintenance\par -s/p flu shot\par -recommended strep pneumonia vaccines: Prevnar-20 vaccine, followed by Pneumo vaccine 23 one year following (completed 12/2022)\par -recommended early intervention for URIs\par -recommended regular osteoporosis evaluations-recommended early dermatological evaluations\par -recommended after the age of 50 to the age of 70, colonoscopy every 5 years\par \par \par Follow up in 3-4 months\par pt is encouraged to call or fax the office with any questions or concerns.\par -Education provided to the PT regarding their visit and conditions.

## 2023-06-09 NOTE — PHYSICAL EXAM
[No Acute Distress] : no acute distress [Normal Oropharynx] : normal oropharynx [Normal Appearance] : normal appearance [No Neck Mass] : no neck mass [Normal Rate/Rhythm] : normal rate/rhythm [Normal S1, S2] : normal s1, s2 [No Murmurs] : no murmurs [No Resp Distress] : no resp distress [Clear to Auscultation Bilaterally] : clear to auscultation bilaterally [No Abnormalities] : no abnormalities [Benign] : benign [Normal Gait] : normal gait [No Clubbing] : no clubbing [No Cyanosis] : no cyanosis [No Edema] : no edema [FROM] : FROM [Normal Color/ Pigmentation] : normal color/ pigmentation [No Focal Deficits] : no focal deficits [Oriented x3] : oriented x3 [Normal Affect] : normal affect [II] : Mallampati Class: II [TextBox_2] : thin [TextBox_68] : I:E 1:3, clear

## 2023-06-09 NOTE — ADDENDUM
[FreeTextEntry1] : Documented by Agata oGrdon acting as a scribe for Dr. Chino Brandon on 06/09/2023 .\par \par All medical record entries made by the Scribe were at my, Dr. Chino Brandon's, direction and personally dictated by me on 06/09/2023. I have reviewed the chart and agree that the record accurately reflects my personal performance of the history, physical exam, assessment and plan. I have also personally directed, reviewed, and agree with the discharge instructions.

## 2023-06-09 NOTE — HISTORY OF PRESENT ILLNESS
[TextBox_4] : Ms. MILLER is a 57 year old female presenting to the office today for f/p pulmonary evaluation. Her chief complaint is-\par \par -she notes feeling generally well\par -she notes flair of coughing that began 1 week ago\par -she notes hemoptysis ( ~1 tsp blood) resolved with Rx\par -she notes urge to cough with deep breathing\par -she notes mild constipation\par -she notes energy levels are good\par -she denies fatigue\par -she notes exercising (weight lifting) without limitations\par -she denies urge to cough when exercising\par -she notes her senses of smell and taste are stable\par -she denies snoring\par -she notes good quality of sleep\par -she notes compliant with Trelegy\par -she notes using the Aerobika device\par -she notes small amount of sputum production\par \par \par -she denies any headaches, nausea, emesis, fever, chills, sweats, chest pain, chest pressure, wheezing, palpitations, diarrhea, vertigo, dysphagia, heartburn, reflux, itchy eyes, itchy ears, leg swelling, arthralgias, myalgias, or sour taste in the mouth.\par

## 2023-06-09 NOTE — PROCEDURE
[FreeTextEntry1] : Chest CT (11/16/2022) revealed interval improvement in the degree of mucoid impaction in the R middle lobe and resolution of the surrounding patchy infiltrates. Stable mild bronchiectasis and impaction in the lingula. No new pneumonia.\par \par Chest X-ray (6/09/23) revealed normal sized heart, bronchiectasis in the R middle lobe and lingula. There is an anterior left upper lung infiltrate consistent with PNA.\par \par Full PFT reveals normal flows; FEV1 was  3.07L which is 122% of predicted; normal lung volumes; normal diffusion at 21.4, which is 123% of predicted; normal flow volume loop.\par PFTs were performed to evaluate for SOB, asthma\par \par FENO was 22; a normal value being less than 25\par Fractional exhaled nitric oxide (FENO) is regarded as a simple, noninvasive method for assessing eosinophilic airway inflammation. Produced by a variety of cells within the lung, nitric oxide (NO) concentrations are generally low in healthy individuals. However, high concentrations of NO appear to be involved in nonspecific host defense mechanisms and chronic inflammatory diseases such as asthma. The American Thor[employer] Society (ATS) therefore has recommended using FENO to aid in the diagnosis and monitoring of eosinophilic airway inflammation and asthma, and for identifying steroid responsive individuals whose chronic respiratory symptoms may be caused by airway inflammation.\par \par \par \par

## 2023-06-24 NOTE — ED ADULT NURSE NOTE - RESPIRATORY ASSESSMENT
Patient : Carlota Amos Age: 68 year old Sex: female   MRN: [id number] Encounter Date: 6/23/2023      History     Chief Complaint   Patient presents with   • Abdominal Pain      [id number]. Pt co of lower abdominal pain that radiates to the right back starting today. Pt has a left stent placed recently for kidney stones. Pt also endorses nausea     HPI  Patient is a 68-year-old female with past medical history of Crohn's, Borderline personality disorder, stable enteroenteric fistula, stone status post left ureteral stent placement on the 14th of this month, presented to the ED for evaluation of left lower quadrant abdominal pain.  On interview, patient is screaming, crying, unable to give coherent details.  She initially states that her tongue is larger than normal and she has some left-sided facial pain and swelling.  She also endorses some left lower quadrant abdominal pain, endorses blood in her urine as well.  Further history limited due to patient's noncooperation.  Patient states her tongue and face were swollen before she got indications, patient also states that she gets itchy with Morphine, but has gotten it in the past.  She denies any fever, chills, states that she vomited once yesterday, states that she has SOB with pain.   Allergies   Allergen Reactions   • Morphine PRURITUS and RASH     Other reaction(s): Itching  patient now denies rash and states can tolerate with diphenhydramine  Patient Has tolerated morphine without the need of pre-medication- Star [facility]     • Fentanyl PRURITUS   • Hydromorphone RASH and PRURITUS     Other reaction(s): Itch  patient can tolerate with diphenhydramine  Other reaction(s): Unknown  Pt tolerated Norco in the past; tolerates morphine 7/18/20  Pt tolerated Norco in the past; tolerates morphine 7/18/20         Discharge Medication List as of 6/23/2023 11:07 PM      Prior to Admission Medications    Details   predniSONE (DELTASONE) 20 MG tablet Take 2 tablets  by mouth daily for 7 days, THEN 1.5 tablets daily for 7 days, THEN 1 tablet daily for 7 days, THEN 0.5 tablets daily for 7 days.Eprescribe, Disp-35 tablet, R-0      tamsulosin (FLOMAX) 0.4 MG Cap Take 1 capsule by mouth daily after a meal for 14 days. Do not start before June 16, 2023.Eprescribe, Disp-14 capsule, R-0      acetaminophen (TYLENOL) 500 MG tablet Take 1,000 mg by mouth daily as needed for Pain.Historical Med      ondansetron (ZOFRAN ODT) 4 MG disintegrating tablet Place 4 mg onto the tongue every 8 hours as needed for Nausea.Historical Med      trazodone (DESYREL) 150 MG tablet Take 150 mg by mouth nightly.Historical Med      ibuprofen (MOTRIN) 600 MG tablet Take 1 tablet by mouth 3 times daily as needed for Pain.Eprescribe, Disp-40 tablet, R-0      QUEtiapine (SEROquel) 100 MG tablet Take 100 mg by mouth at bedtime.Historical Med      LORazepam (ATIVAN) 1 MG tablet Take 3 tablets by mouth nightly.Historical Med         Discontinued Medications       dicyclomine (BENTYL) 10 MG capsule              Past Medical History:   Diagnosis Date   • Arthritis    • Asthma    • Crohn's disease (CMD)    • Kidney stones    • Osteoporosis        Past Surgical History:   Procedure Laterality Date   • ABDOMEN SURGERY     • APPENDECTOMY     • COLON SURGERY     • SERVICE TO GASTROENTEROLOGY         Family History   Problem Relation Age of Onset   • Patient is unaware of any medical problems Other        Social History     Tobacco Use   • Smoking status: Never   • Smokeless tobacco: Never   Vaping Use   • Vaping Use: never used   Substance Use Topics   • Alcohol use: Never   • Drug use: Never       Review of Systems   Constitutional: Negative for chills and fever.   HENT: Positive for facial swelling. Negative for congestion, ear pain and sore throat.    Eyes: Negative for pain and visual disturbance.   Respiratory: Negative for cough and shortness of breath.    Cardiovascular: Negative for chest pain and palpitations.    Gastrointestinal: Positive for abdominal pain and vomiting. Negative for nausea.   Genitourinary: Negative for dysuria and hematuria.   Musculoskeletal: Negative for back pain and myalgias.   Skin: Negative for color change and rash.   Neurological: Negative for dizziness and headaches.       Physical Exam     ED Triage Vitals   ED Triage Vitals Group      Temp 06/23/23 1553 98.2 °F (36.8 °C)      Heart Rate 06/23/23 1553 81      Resp 06/23/23 1553 20      BP 06/23/23 1553 128/77      SpO2 06/23/23 1553 96 %      EtCO2 mmHg --       Height --       Weight 06/23/23 1555 132 lb 15 oz (60.3 kg)      Weight Scale Used 06/23/23 1555 Standing scale      BMI (Calculated) --       IBW/kg (Calculated) --        Physical Exam  Constitutional:       Appearance: Normal appearance. She is normal weight.   HENT:      Head: Normocephalic and atraumatic.      Nose: Nose normal.      Mouth/Throat:      Mouth: Mucous membranes are moist.      Pharynx: Oropharynx is clear.      Comments: Patient has a trauma exam, she states it is largely normal, however nursing staff is at baseline for she is presented multiple times before with similar complaints.     Neck: Normal range of motion and neck supple.   Eyes:      General:         Right eye: No discharge.         Left eye: No discharge.      Extraocular Movements: Extraocular movements intact.      Conjunctiva/sclera: Conjunctivae normal.   Cardiovascular:      Rate and Rhythm: Normal rate and regular rhythm.      Heart sounds: No murmur heard.  Pulmonary:      Effort: Pulmonary effort is normal.      Breath sounds: Normal breath sounds.   Abdominal:      General: Abdomen is flat.   Musculoskeletal:         General: No swelling or tenderness.   Skin:     General: Skin is warm and dry.      Findings: No rash.   Neurological:      General: No focal deficit present.      Mental Status: She is alert and oriented to person, place, and time.      Comments: Moves all extremities  spontaneously, patient has some muffled speech due to large tongue.  Is unable to answer questions appropriately, is tangential   Psychiatric:         Speech: Speech is rapid and pressured and tangential.         Behavior: Behavior is uncooperative, agitated and hyperactive.         ED Course     Procedures    Lab Results     Results for orders placed or performed during the hospital encounter of 06/23/23   Comprehensive Metabolic Panel   Result Value Ref Range    Fasting Status      Sodium 134 (L) 135 - 145 mmol/L    Potassium 3.1 (L) 3.4 - 5.1 mmol/L    Chloride 97 97 - 110 mmol/L    Carbon Dioxide 29 21 - 32 mmol/L    Anion Gap 11 7 - 19 mmol/L    Glucose 136 (H) 70 - 99 mg/dL    BUN 10 6 - 20 mg/dL    Creatinine 0.77 0.51 - 0.95 mg/dL    Glomerular Filtration Rate 84 >=60    BUN/Cr 13 7 - 25    Calcium 9.5 8.4 - 10.2 mg/dL    Bilirubin, Total 0.7 0.2 - 1.0 mg/dL    GOT/AST 19 <=37 Units/L    GPT/ALT 17 <64 Units/L    Alkaline Phosphatase 79 45 - 117 Units/L    Albumin 3.4 (L) 3.6 - 5.1 g/dL    Protein, Total 7.5 6.4 - 8.2 g/dL    Globulin 4.1 (H) 2.0 - 4.0 g/dL    A/G Ratio 0.8 (L) 1.0 - 2.4   Lipase   Result Value Ref Range    Lipase 245 73 - 393 Units/L   Urinalysis & Reflex Microscopy With Culture If Indicated   Result Value Ref Range    COLOR, URINALYSIS Brown     APPEARANCE, URINALYSIS Cloudy     GLUCOSE, URINALYSIS Negative Negative mg/dL    BILIRUBIN, URINALYSIS Negative Negative    KETONES, URINALYSIS Negative Negative mg/dL    SPECIFIC GRAVITY, URINALYSIS 1.012 1.005 - 1.030    OCCULT BLOOD, URINALYSIS Large (A) Negative    PH, URINALYSIS 6.5 5.0 - 7.0    PROTEIN, URINALYSIS 30 (A) Negative mg/dL    UROBILINOGEN, URINALYSIS 0.2 0.2, 1.0 mg/dL    NITRITE, URINALYSIS Negative Negative    LEUKOCYTE ESTERASE, URINALYSIS Large (A) Negative    SQUAMOUS EPITHELIAL, URINALYSIS 26 to 100 (A) None Seen, 1 to 5 /hpf    ERYTHROCYTES, URINALYSIS >100 (A) None Seen, 1 to 2 /hpf    LEUKOCYTES, URINALYSIS >100 (A) None  Seen, 1 to 5 /hpf    BACTERIA, URINALYSIS Few (A) None Seen /hpf    HYALINE CASTS, URINALYSIS None Seen None Seen, 1 to 5 /lpf    MUCUS Present    CBC with Automated Differential (performable only)   Result Value Ref Range    WBC 10.9 4.2 - 11.0 K/mcL    RBC 4.55 4.00 - 5.20 mil/mcL    HGB 14.0 12.0 - 15.5 g/dL    HCT 41.2 36.0 - 46.5 %    MCV 90.5 78.0 - 100.0 fl    MCH 30.8 26.0 - 34.0 pg    MCHC 34.0 32.0 - 36.5 g/dL    RDW-CV 14.0 11.0 - 15.0 %    RDW-SD 46.4 39.0 - 50.0 fL     140 - 450 K/mcL    NRBC 0 <=0 /100 WBC    Neutrophil, Percent 81 %    Lymphocytes, Percent 12 %    Mono, Percent 6 %    Eosinophils, Percent 0 %    Basophils, Percent 0 %    Immature Granulocytes 1 %    Absolute Neutrophils 8.9 (H) 1.8 - 7.7 K/mcL    Absolute Lymphocytes 1.3 1.0 - 4.0 K/mcL    Absolute Monocytes 0.6 0.3 - 0.9 K/mcL    Absolute Eosinophils  0.0 0.0 - 0.5 K/mcL    Absolute Basophils 0.0 0.0 - 0.3 K/mcL    Absolute Immature Granulocytes 0.1 0.0 - 0.2 K/mcL       EKG Results     EKG Interpretation  Rate: 94  Rhythm: normal sinus rhythm   Abnormality: No acute ischemic changes noted, no change from prior EKG    EKG tracing interpreted by ED physician    Radiology Results     Imaging Results          CT ABDOMEN PELVIS WO CONTRAST (Final result)  Result time 06/23/23 20:28:58    Final result                 Impression:    1.   Evaluation is limited due to lack of enteric and intravenous contrast  as well as motion artifact.  2.   Persistent thickening of the 2nd part of the duodenum with interval  improvement in previously seen adjacent inflammation.  3.   Interval resolution of previously seen thickening within the distal  ileum.  No evidence for bowel obstruction.  4.   Persistent probable enteroenteric fistula within the left lower  quadrant.  5.   Interval resolution of previously seen left pelviectasis and  periureteral fat stranding.  Unchanged left ureteral stent.  6.   Additional findings as  above.    Electronically Signed by: DIANELYS LINDSAY M.D.   Signed on: 6/23/2023 8:28 PM   Workstation ID: [id number]             Narrative:    EXAM: CT ABDOMEN PELVIS WO CONTRAST    CLINICAL INDICATION: Acute abdominal pain    COMPARISON:  CT abdomen and pelvis dated 06/18/2023     TECHNIQUE: Multiplanar noncontrast CT of the abdomen and pelvis was  performed. mA and/or kVp was adjusted for patient size.    FINDINGS:   Lower thorax: Grossly unremarkable.    Evaluation of the abdomen and pelvis is limited due to motion artifact as  well as the lack of intravenous and enteric contrast material.    Liver, biliary tree, and gallbladder: No definite focal intrahepatic  lesions.  Stable prominent of the intra and extra hepatic bile ducts is  likely related to prior cholecystectomy.    Spleen: Unremarkable.    Adrenal glands: Unremarkable.    Pancreas: Unremarkable.    Kidneys, ureters, and urinary bladder: Left ureteral stent is again noted.   Interval resolution of previously seen left pelviectasis/hydronephrosis.   No definite periureteral stranding is identified on the current  examination.  Punctate calculus in the right kidney.  No evidence for  obstructive uropathy.  Unremarkable urinary bladder apart from ureteral  stent.    Bowels and peritoneal cavity: No evidence for bowel obstruction.   Evaluation of bowel loops is limited by motion artifact.  Postoperative  changes related to right hemicolectomy with ileocolic anastomosis.  Mild  mural thickening of the duodenum, similar in appearance to the prior  examination, although there appears to be improvement in the previously  seen periduodenal fat stranding.  Wall thickening between the adjacent  small bowel loops in the left lower quadrant with possible communication  (series 2, image 121), appears unchanged, and may reflect fistulous  communication.  Thickening of distal ileal bowel loops is also improved in  comparison to the prior examination.  No free  intraperitoneal air or fluid.    Vessels: Normal caliber thoracic aorta.    Lymph nodes: None.    Body wall/bones: Midline abdominal scarring.  Bilateral spondylolysis at L5  with grade 1 spondylolisthesis of L5 on S1.                                ED Medication Orders (From admission, onward)    Ordered Start     Status Ordering Provider    06/23/23 1848 06/23/23 1849  EPINEPHrine (ADRENALIN) injection 0.3 mg  ONCE         Last MAR action: Given MEREDITH SANDERS    06/23/23 1848 06/23/23 1849  methylPREDNISolone (SOLU-Medrol) injection 80 mg  ONCE         Last MAR action: Given MEREDITH SANDERS    06/23/23 1848 06/23/23 1849  famotidine (PEPCID) injection 20 mg  ONCE         Last MAR action: Given MEREDITH SANDERS    06/23/23 1602 06/23/23 1603  diphenhydrAMINE (BENADRYL) injection 25 mg  ONCE         Last MAR action: Given HERBERT BROUSSARD    06/23/23 1602 06/23/23 1603  morphine injection 4 mg  ONCE         Last MAR action: Given HERBERT BROUSSARD               [facility]  Patient is a 68-year-old female with past medical history of Crohn's, Borderline personality disorder, stable enteroenteric fistula, stone status post left ureteral stent placement on the 14th of this month, presented to the ED for evaluation of left lower quadrant abdominal pain.  CBC, CMP shows mild decreased potassium at 3.1.  Patient's UA shows evidence of large blood, is contaminated with multiple signs of the renal cells show some bacteria which is as expected given recent stent placement.Patient's CT of the abdomen pelvis shows evidence of improvement in previously seen inflammation of the duodenum.  Also sees resolution of previously seen thickening within the distal ileum and shows stable persistent enteroenteric fistula in the left lower quadrant.  It also shows resolution of previous left pelviectasis and periureteral fat stenting.  Left ureteral stend unchanged.  On exam, patient with large tongue, given patient insisting that her tongue is larger  than normal and she has facial swelling, patient given steroids, IM epinephrine, Benadryl.  Upon reevaluation, patient's symptoms are exactly the same.  Patient was never hypoxic, admitted any worsening respiratory effort.  Lungs are clear to auscultation bilaterally.  Per nursing staff it was noted that patient always presents with the same symptoms and claims that she has a larger tongue and having issues with facial swelling.  Likely acute allergic reaction given no change in patient symptoms before or after administration of epinephrine, previous presentations similar as well.  Vital signs were also stable.  Less likely acute abdominal process given CT unchanged from previous and is actually improving in terms of previous inflammation noted.  Likely issue with ureteral stent as initial to be unchanged in situ.  As such patient stable for discharge at this time.  Of note, patient left before taking potassium repletion as well.     Clinical Impression     ED Diagnosis   1. Left lower quadrant abdominal pain            Disposition        Discharge 6/23/2023 10:58 PM  Carlota Amos discharge to home/self care.      .PT PROVIDED WRITTEN AND VERBAL DC INSTRUCTIONS. VERBALIZES UNDERSTANDING. NO APPARENT DISTRESS. VSS. AMBULATED OUT OF ED WITH STEADY GAIT.         Romulo Lambert MD  Resident  06/23/23 2313       Romulo Lambert MD  Resident  06/23/23 3117       Romulo Lambert MD  Resident  06/23/23 [id number]     WDL

## 2023-08-14 ENCOUNTER — APPOINTMENT (OUTPATIENT)
Dept: PULMONOLOGY | Facility: CLINIC | Age: 58
End: 2023-08-14

## 2023-12-26 ENCOUNTER — RX RENEWAL (OUTPATIENT)
Age: 58
End: 2023-12-26

## 2023-12-26 RX ORDER — OLOPATADINE HYDROCHLORIDE 665 UG/1
0.6 SPRAY, METERED NASAL
Qty: 3 | Refills: 1 | Status: ACTIVE | COMMUNITY
Start: 2022-11-07 | End: 1900-01-01

## 2024-01-30 ENCOUNTER — RX RENEWAL (OUTPATIENT)
Age: 59
End: 2024-01-30

## 2024-01-30 RX ORDER — ALBUTEROL SULFATE 90 UG/1
108 (90 BASE) INHALANT RESPIRATORY (INHALATION)
Qty: 1 | Refills: 5 | Status: ACTIVE | COMMUNITY
Start: 2024-01-30 | End: 1900-01-01

## 2024-02-16 ENCOUNTER — APPOINTMENT (OUTPATIENT)
Dept: PULMONOLOGY | Facility: CLINIC | Age: 59
End: 2024-02-16
Payer: COMMERCIAL

## 2024-02-16 VITALS
TEMPERATURE: 97.4 F | WEIGHT: 115 LBS | DIASTOLIC BLOOD PRESSURE: 70 MMHG | RESPIRATION RATE: 16 BRPM | HEART RATE: 68 BPM | HEIGHT: 64 IN | SYSTOLIC BLOOD PRESSURE: 115 MMHG | BODY MASS INDEX: 19.63 KG/M2 | OXYGEN SATURATION: 98 %

## 2024-02-16 DIAGNOSIS — R76.8 OTHER SPECIFIED ABNORMAL IMMUNOLOGICAL FINDINGS IN SERUM: ICD-10-CM

## 2024-02-16 DIAGNOSIS — J45.30 MILD PERSISTENT ASTHMA, UNCOMPLICATED: ICD-10-CM

## 2024-02-16 DIAGNOSIS — R04.2 HEMOPTYSIS: ICD-10-CM

## 2024-02-16 DIAGNOSIS — J30.9 ALLERGIC RHINITIS, UNSPECIFIED: ICD-10-CM

## 2024-02-16 DIAGNOSIS — J47.9 BRONCHIECTASIS, UNCOMPLICATED: ICD-10-CM

## 2024-02-16 PROCEDURE — 99214 OFFICE O/P EST MOD 30 MIN: CPT | Mod: 25

## 2024-02-16 PROCEDURE — 95012 NITRIC OXIDE EXP GAS DETER: CPT

## 2024-02-16 PROCEDURE — 94010 BREATHING CAPACITY TEST: CPT

## 2024-02-16 RX ORDER — FLUTICASONE FUROATE, UMECLIDINIUM BROMIDE AND VILANTEROL TRIFENATATE 100; 62.5; 25 UG/1; UG/1; UG/1
100-62.5-25 POWDER RESPIRATORY (INHALATION)
Qty: 180 | Refills: 1 | Status: ACTIVE | COMMUNITY
Start: 2022-11-07 | End: 1900-01-01

## 2024-02-16 RX ORDER — AMOXICILLIN AND CLAVULANATE POTASSIUM 875; 125 MG/1; MG/1
875-125 TABLET, COATED ORAL TWICE DAILY
Qty: 16 | Refills: 0 | Status: DISCONTINUED | COMMUNITY
Start: 2023-06-09 | End: 2024-02-16

## 2024-02-16 NOTE — PHYSICAL EXAM
[No Acute Distress] : no acute distress [Normal Oropharynx] : normal oropharynx [Normal Appearance] : normal appearance [No Neck Mass] : no neck mass [Normal Rate/Rhythm] : normal rate/rhythm [Normal S1, S2] : normal s1, s2 [No Murmurs] : no murmurs [No Resp Distress] : no resp distress [Clear to Auscultation Bilaterally] : clear to auscultation bilaterally [No Abnormalities] : no abnormalities [Benign] : benign [Normal Gait] : normal gait [No Clubbing] : no clubbing [No Cyanosis] : no cyanosis [No Edema] : no edema [FROM] : FROM [Normal Color/ Pigmentation] : normal color/ pigmentation [No Focal Deficits] : no focal deficits [Oriented x3] : oriented x3 [Normal Affect] : normal affect [III] : Mallampati Class: III [TextBox_2] : thin [TextBox_68] : I:E 1:3, clear

## 2024-02-16 NOTE — HISTORY OF PRESENT ILLNESS
[TextBox_4] : Ms. MILLER is a 58 year old female presenting to the office today for a follow-up pulmonary evaluation. Her chief complaint is  -she notes feeling generally well  -she denies any URIs, coughing, or congestion this winter. She's been regularly using her ventolin before exercising -she notes exercising without limitations -she notes weight is stable  -she notes her GERD has been quiet  -she denies any headaches, nausea, emesis, fever, chills, sweats, chest pain, chest pressure, coughing, wheezing, palpitations, diarrhea, constipation, dysphagia, vertigo, arthralgias, myalgias, leg swelling, itchy eyes, itchy ears, heartburn, reflux, or sour taste in the mouth.

## 2024-02-16 NOTE — ASSESSMENT
[FreeTextEntry1] : Ms. MILLER is a 58 year old female with a prior history of spinal leak s/p blood patch 2018, remote chickenpox, thrombocytopenia (allergic age 42 midst of pregnancy, s/p COVID-19 x2 1/2021 and summer 2022, carries the diagnosis of mild persistent asthma, chronic cough, s/p recent PNA Aug 2022-chronic cough, persistent hemoptysis; elevated IgE (306)- asymptomatic (on Trelegy)  The patient's Chronic Cough is felt to be multifactorial: - Asthma - Allergy and Sinus - ?Reflux (less likely) - Bronchiectasis  Problem 1: Chronic cough (resolved) - Any cough greater than three weeks duration-differential diagnosis includes-asthma, upper airway cough syndrome, post nasal drip syndrome, gastroesophageal reflux, laryngopharyngeal reflux, cardiac disease (congestive heart failure, medicines, effects, etc), medication effects (b-blockers, ace inhibitors, ARBs, glaucoma meds, etc.), smoking, infectious, multifactorial, etc.  Problem 2: Asthma- controlled - continue Trelegy 200 1 inhalation QD - continue Ventolin 2 puffs Q6H, pre-exercise - Inhaler technique reviewed as well as oral hygiene techniques reviewed with patient. Avoidance of cold air, extremes of temperature, rescue inhaler should be used before exercise. Order of medication reviewed with patient. Recommended use of a cool mist humidifier in the bedroom. - Asthma is believed to be caused by inherited (genetic) and environmental factor, but its exact cause is unknown. Asthma may be triggered by allergens, lung infections, or irritants in the air. Asthma triggers are different for each person  Problem 2A: Elevated IgE -add Xolair if needed Xolair is a recombinant DNA- derived humanized IgG1K monoclonal antibody that selectively binds to human immunoglobulin E (IgE). Xolair is produced by a Chinese hamster ovary cell suspension culture in nutrient medium containing the antibiotic gentamicin. Gentamicin is not detectable in the final product. -Xolair is a sterile, white, preservative free, lyophilized powder contained in a single use vial that is reconstituted with sterile water for suspension. Side effects include: wheezing, tightness of the chest, trouble breathing, hives, skin rash, feeling anxious or light-headed, fainting, warmth or tingling under skin, or swelling of face, lips, or tongue.  Problem 3: Allergy and PND - continue Olopatadine 0.6% 1 sniff each nostril twice a day - s/p blood work: asthma profile, food IgE panel (+), eosinophil level, IgE level (306), Vitamin D level - Environmental measures for allergies were encouraged including mattress and pillow cover, air purifier, and environmental controls  Problem 4: ?GERD -Rule of 2s: avoid eating too much, eating too late, eating too spicy, eating two hours before bed. - Things to avoid including overeating, spicy foods, tight clothing, eating within two hours of bed, this list is not all inclusive. - For treatments of reflux, possible options discussed including diet control, H2 blockers, PPIs, as well as coating motility agents discussed as treatment options. Timing of meals and proximity of last meal to sleep were discussed. If symptoms persist, a formal gastrointestinal evaluation is needed.  Problem 5: Bronchiectasis, (+) PNA (resolved) -s/p Augmentin 875 mg BID for 8 days - s/p strep pneumonia titers (low), quantitative immunoglobulins, IgG subclass (normal) - s/p CF Panel (normal) - Aerobika Device - Send Sputum for Afbx3 and C&S (NC)-rescripted 6/2023 - Seen on the CT of the chest or chest x-ray signifies damaged bronchial tubes focal or diffuse which can be sites of recurrent infections. These areas can be colonized by various organisms including bacteria (hemophilous influenzae/Pseudomonas species etc.) as well as acid fast bacilli (mycobacterial disease- inclusive of TB/TAJ etc.). Sputum either for bacteria culture/sensitivity or AFB culture and sensitivity will need to be sent if the patient has sputum- 3 specimens on consecutive days will need to be dropped at the laboratory- if the patient can produce sputum.  Problem 6: Hemoptysis (likely related to Bronchiectasis, ?TAJ/JU) (quiet) - s/p HRCT of the chest 11/2022 - s/p QuantiFERON gold test (WNL) - Hydration - Quantification of the blood and if it persist then consider bronchoscopy depending on amount of blood - The patient has coughed up blood-possibilities include acute/bronchitis, bronchiectasis, endobronchial tumor, epistaxis or other hand and neck source or gastrointestinal site.  Problem 7: COVID-19 Education - s/p COVID-19 x2 1/2021 and summer 2022  Problem 8: Cardiac -Recommended follow-up evaluation with cardiologist if needed  Problem 9: Health maintenance -refused yearly flu shot 2023 -recommended strep pneumonia vaccines: Prevnar-20 vaccine, followed by Pneumo vaccine 23 one year following (completed 12/2022) -recommended early intervention for URIs -recommended regular osteoporosis evaluations-recommended early dermatological evaluations -recommended after the age of 50 to the age of 70, colonoscopy every 5 years  Follow up in 3-4 months pt is encouraged to call or fax the office with any questions or concerns. -Education provided to the PT regarding their visit and conditions.

## 2024-02-16 NOTE — ADDENDUM
[FreeTextEntry1] : Documented by Agata Gordon acting as a scribe for Dr. Chino Brandon on 02/16/2024. All medical record entries made by the Scribe were at my, Dr. Chino Brandon's, direction and personally dictated by me on 02/16/2024. I have reviewed the chart and agree that the record accurately reflects my personal performance of the history, physical exam, assessment and plan. I have also personally directed, reviewed, and agree with the discharge instructions.

## 2024-02-16 NOTE — PROCEDURE
[FreeTextEntry1] : PFTs revealed normal flows; FEV1 was  2.70L, which is 102.4% of predicted; normal flow volume loop. PFTs were performed to evaluate for SOB, asthma  FENO was 19; a normal value being less than 25 Fractional exhaled nitric oxide (FENO) is regarded as a simple, noninvasive method for assessing eosinophilic airway inflammation. Produced by a variety of cells within the lung, nitric oxide (NO) concentrations are generally low in healthy individuals. However, high concentrations of NO appear to be involved in nonspecific host defense mechanisms and chronic inflammatory diseases such as asthma. The American Thor[employer] Society (ATS) therefore has recommended using FENO to aid in the diagnosis and monitoring of eosinophilic airway inflammation and asthma, and for identifying steroid responsive individuals whose chronic respiratory symptoms may be caused by airway inflammation.

## 2024-03-11 RX ORDER — LEVALBUTEROL TARTRATE 45 UG/1
45 AEROSOL, METERED ORAL
Qty: 1 | Refills: 5 | Status: ACTIVE | COMMUNITY
Start: 2024-03-11 | End: 1900-01-01

## 2024-03-11 RX ORDER — ALBUTEROL SULFATE 90 UG/1
108 (90 BASE) AEROSOL, METERED RESPIRATORY (INHALATION) EVERY 6 HOURS
Qty: 1 | Refills: 2 | Status: DISCONTINUED | COMMUNITY
Start: 2023-06-09 | End: 2024-03-11

## 2024-08-06 ENCOUNTER — TRANSCRIPTION ENCOUNTER (OUTPATIENT)
Age: 59
End: 2024-08-06

## 2024-10-15 ENCOUNTER — APPOINTMENT (OUTPATIENT)
Dept: PULMONOLOGY | Facility: CLINIC | Age: 59
End: 2024-10-15
Payer: COMMERCIAL

## 2024-10-15 VITALS
HEIGHT: 64 IN | DIASTOLIC BLOOD PRESSURE: 66 MMHG | SYSTOLIC BLOOD PRESSURE: 108 MMHG | HEART RATE: 59 BPM | TEMPERATURE: 97.9 F | WEIGHT: 112 LBS | OXYGEN SATURATION: 98 % | RESPIRATION RATE: 16 BRPM | BODY MASS INDEX: 19.12 KG/M2

## 2024-10-15 DIAGNOSIS — J30.9 ALLERGIC RHINITIS, UNSPECIFIED: ICD-10-CM

## 2024-10-15 DIAGNOSIS — J47.9 BRONCHIECTASIS, UNCOMPLICATED: ICD-10-CM

## 2024-10-15 DIAGNOSIS — J18.9 PNEUMONIA, UNSPECIFIED ORGANISM: ICD-10-CM

## 2024-10-15 DIAGNOSIS — R76.8 OTHER SPECIFIED ABNORMAL IMMUNOLOGICAL FINDINGS IN SERUM: ICD-10-CM

## 2024-10-15 DIAGNOSIS — U07.1 COVID-19: ICD-10-CM

## 2024-10-15 DIAGNOSIS — J45.30 MILD PERSISTENT ASTHMA, UNCOMPLICATED: ICD-10-CM

## 2024-10-15 PROCEDURE — 90715 TDAP VACCINE 7 YRS/> IM: CPT

## 2024-10-15 PROCEDURE — ZZZZZ: CPT

## 2024-10-15 PROCEDURE — 94729 DIFFUSING CAPACITY: CPT

## 2024-10-15 PROCEDURE — 95012 NITRIC OXIDE EXP GAS DETER: CPT

## 2024-10-15 PROCEDURE — 94010 BREATHING CAPACITY TEST: CPT

## 2024-10-15 PROCEDURE — 90471 IMMUNIZATION ADMIN: CPT

## 2024-10-15 PROCEDURE — 99214 OFFICE O/P EST MOD 30 MIN: CPT | Mod: 25

## 2024-10-15 RX ORDER — BUDESONIDE, GLYCOPYRROLATE, AND FORMOTEROL FUMARATE 160; 9; 4.8 UG/1; UG/1; UG/1
160-9-4.8 AEROSOL, METERED RESPIRATORY (INHALATION)
Qty: 1 | Refills: 3 | Status: ACTIVE | COMMUNITY
Start: 2024-10-15 | End: 1900-01-01

## 2025-03-12 ENCOUNTER — TRANSCRIPTION ENCOUNTER (OUTPATIENT)
Age: 60
End: 2025-03-12

## 2025-03-12 RX ORDER — INHALER, ASSIST DEVICES
SPACER (EA) MISCELLANEOUS
Qty: 1 | Refills: 0 | Status: ACTIVE | COMMUNITY
Start: 2025-03-12 | End: 1900-01-01

## 2025-03-13 ENCOUNTER — TRANSCRIPTION ENCOUNTER (OUTPATIENT)
Age: 60
End: 2025-03-13

## 2025-04-15 ENCOUNTER — APPOINTMENT (OUTPATIENT)
Dept: PULMONOLOGY | Facility: CLINIC | Age: 60
End: 2025-04-15
Payer: COMMERCIAL

## 2025-04-15 VITALS
HEIGHT: 64 IN | DIASTOLIC BLOOD PRESSURE: 70 MMHG | TEMPERATURE: 97.2 F | RESPIRATION RATE: 16 BRPM | HEART RATE: 63 BPM | OXYGEN SATURATION: 99 % | WEIGHT: 110 LBS | SYSTOLIC BLOOD PRESSURE: 102 MMHG | BODY MASS INDEX: 18.78 KG/M2

## 2025-04-15 DIAGNOSIS — J45.30 MILD PERSISTENT ASTHMA, UNCOMPLICATED: ICD-10-CM

## 2025-04-15 DIAGNOSIS — R76.8 OTHER SPECIFIED ABNORMAL IMMUNOLOGICAL FINDINGS IN SERUM: ICD-10-CM

## 2025-04-15 DIAGNOSIS — R04.2 HEMOPTYSIS: ICD-10-CM

## 2025-04-15 DIAGNOSIS — J47.9 BRONCHIECTASIS, UNCOMPLICATED: ICD-10-CM

## 2025-04-15 DIAGNOSIS — J30.9 ALLERGIC RHINITIS, UNSPECIFIED: ICD-10-CM

## 2025-04-15 DIAGNOSIS — Z87.01 PERSONAL HISTORY OF PNEUMONIA (RECURRENT): ICD-10-CM

## 2025-04-15 PROCEDURE — 95012 NITRIC OXIDE EXP GAS DETER: CPT

## 2025-04-15 PROCEDURE — 94010 BREATHING CAPACITY TEST: CPT

## 2025-04-15 PROCEDURE — 99214 OFFICE O/P EST MOD 30 MIN: CPT | Mod: 25

## 2025-04-25 ENCOUNTER — APPOINTMENT (OUTPATIENT)
Dept: ORTHOPEDIC SURGERY | Facility: CLINIC | Age: 60
End: 2025-04-25